# Patient Record
Sex: FEMALE | Race: WHITE | URBAN - METROPOLITAN AREA
[De-identification: names, ages, dates, MRNs, and addresses within clinical notes are randomized per-mention and may not be internally consistent; named-entity substitution may affect disease eponyms.]

---

## 2017-09-05 ENCOUNTER — OFFICE VISIT (OUTPATIENT)
Dept: URGENT CARE | Facility: CLINIC | Age: 57
End: 2017-09-05
Payer: COMMERCIAL

## 2017-09-05 VITALS
TEMPERATURE: 98.5 F | RESPIRATION RATE: 12 BRPM | WEIGHT: 126 LBS | HEART RATE: 66 BPM | BODY MASS INDEX: 22.32 KG/M2 | SYSTOLIC BLOOD PRESSURE: 120 MMHG | OXYGEN SATURATION: 98 % | HEIGHT: 63 IN | DIASTOLIC BLOOD PRESSURE: 74 MMHG

## 2017-09-05 DIAGNOSIS — K57.92 ACUTE DIVERTICULITIS: ICD-10-CM

## 2017-09-05 PROCEDURE — 99203 OFFICE O/P NEW LOW 30 MIN: CPT | Performed by: FAMILY MEDICINE

## 2017-09-05 RX ORDER — CIPROFLOXACIN 500 MG/1
500 TABLET, FILM COATED ORAL 2 TIMES DAILY
Qty: 20 TAB | Refills: 0 | Status: SHIPPED | OUTPATIENT
Start: 2017-09-05 | End: 2017-09-15

## 2017-09-05 NOTE — PROGRESS NOTES
"Subjective:      Nila Hammond is a 57 y.o. female who presents with Diverticulitis (had it a year ago and having a reoccurance, abdominal pressure, tenderness, aches, cramping, constipation )  Patient has had several days of transverse abdominal cramping pressure altered bowel habits. She had a similar experience approximately a year ago was diagnosed with diverticulitis based on Cipro and Flagyl with resolution of symptoms. She denies any current fevers or chills no blood in her stool no nausea or vomiting. She did have a colonoscopy several years ago no mention at that time of diverticulosis. She's not had a CT scan confirming the diagnosis of diverticulitis or diverticulosis. She did not tolerate the Flagyl 2 well last time she was treated. She's had multiple abdominal surgeries no history of adhesions or small bowel obstruction  No dysuria or hematuria  sHPI  ROS  PMH:  has no past medical history on file.  MEDS: No current outpatient prescriptions on file.  ALLERGIES:   Allergies   Allergen Reactions   • Percocet [Apap-Fd&C Red #40 Al Dillon-Oxycodone]      Hallucinations    SURGHX: Appendectomy,  ×2, hernia surgery  SOCHX:  reports that she has never smoked. She has never used smokeless tobacco.  FH: Family history was reviewed, no pertinent findings to report     Objective:     /74   Pulse 66   Temp 36.9 °C (98.5 °F)   Resp 12   Ht 1.6 m (5' 3\")   Wt 57.2 kg (126 lb)   SpO2 98%   Breastfeeding? No   BMI 22.32 kg/m²      Physical Exam   Constitutional: She is oriented to person, place, and time. She appears well-developed and well-nourished. No distress.   HENT:   Head: Normocephalic.   Mouth/Throat: Oropharynx is clear and moist.   Cardiovascular: Normal rate, regular rhythm and normal heart sounds.    Pulmonary/Chest: Effort normal and breath sounds normal.   Abdominal: Soft. Bowel sounds are normal. She exhibits no distension and no mass. There is tenderness. There is no rebound and " no guarding. No hernia.       Musculoskeletal: Normal range of motion.   Neurological: She is alert and oriented to person, place, and time.   Skin: Skin is warm and dry. No rash noted. She is not diaphoretic. No erythema. No pallor.   Psychiatric: She has a normal mood and affect. Her behavior is normal. Judgment and thought content normal.          Assessment/Plan:     1. Acute diverticulitis  ciprofloxacin (CIPRO) 500 MG Tab     Patient will follow up when she gets back home to [place] with her primary care provider in 2 weeks will have a CT scan done at that time as she has not had skin that has demonstrated diverticulosis or iritis or a colonoscopy that has shown emergency room precautions are given

## 2018-10-17 ENCOUNTER — OFFICE VISIT (OUTPATIENT)
Dept: URGENT CARE | Facility: CLINIC | Age: 58
End: 2018-10-17
Payer: COMMERCIAL

## 2018-10-17 VITALS
DIASTOLIC BLOOD PRESSURE: 76 MMHG | TEMPERATURE: 100.1 F | OXYGEN SATURATION: 94 % | HEIGHT: 63 IN | SYSTOLIC BLOOD PRESSURE: 120 MMHG | WEIGHT: 127.4 LBS | HEART RATE: 88 BPM | BODY MASS INDEX: 22.57 KG/M2

## 2018-10-17 DIAGNOSIS — K57.92 DIVERTICULITIS: Primary | ICD-10-CM

## 2018-10-17 PROCEDURE — 99214 OFFICE O/P EST MOD 30 MIN: CPT | Performed by: FAMILY MEDICINE

## 2018-10-17 RX ORDER — METRONIDAZOLE 500 MG/1
500 TABLET ORAL 3 TIMES DAILY
Qty: 30 TAB | Refills: 0 | Status: SHIPPED | OUTPATIENT
Start: 2018-10-17 | End: 2018-10-27

## 2018-10-17 RX ORDER — CIPROFLOXACIN 500 MG/1
500 TABLET, FILM COATED ORAL 2 TIMES DAILY
Qty: 20 TAB | Refills: 0 | Status: SHIPPED | OUTPATIENT
Start: 2018-10-17 | End: 2018-10-27

## 2018-10-17 ASSESSMENT — ENCOUNTER SYMPTOMS
HEMATOCHEZIA: 0
CONSTIPATION: 1
COUGH: 0
CHILLS: 0
DIARRHEA: 1
ANOREXIA: 0
FLATUS: 1
SHORTNESS OF BREATH: 0
FEVER: 1
VOMITING: 0
BELCHING: 1
ABDOMINAL PAIN: 1
NAUSEA: 0

## 2018-10-17 ASSESSMENT — CROHNS DISEASE ACTIVITY INDEX (CDAI): CDAI SCORE: 0

## 2018-10-17 NOTE — PROGRESS NOTES
Subjective:   Nila Hammond is a 58 y.o. female who presents for Abdominal Pain (x4 days )    Abdominal Pain   This is a new problem. Episode onset: 4 days. The onset quality is gradual. The problem occurs constantly. The problem has been unchanged. The pain is located in the generalized abdominal region. The pain is moderate. The quality of the pain is dull, cramping and aching. The abdominal pain does not radiate. Associated symptoms include belching, constipation, diarrhea, a fever and flatus. Pertinent negatives include no anorexia, dysuria, frequency, hematochezia, hematuria, nausea or vomiting. Nothing aggravates the pain. The pain is relieved by nothing. She has tried nothing for the symptoms. Prior workup: Colonoscopy in May- Negative  Her past medical history is significant for irritable bowel syndrome. There is no history of abdominal surgery, colon cancer, Crohn's disease, gallstones or ulcerative colitis.     Patient has recurrent episodes of diverticulitis, commonly associated with travel.  She was just traveling to  x 1 week ago.  Last BM: Today 2 PM loose, no blood or mucus or greasy appearance.  Patient has a follow-up scheduled with PCP next week.    Review of Systems   Constitutional: Positive for fever. Negative for chills and malaise/fatigue.   Respiratory: Negative for cough and shortness of breath.    Gastrointestinal: Positive for abdominal pain, constipation, diarrhea and flatus. Negative for anorexia, hematochezia, nausea and vomiting.   Genitourinary: Negative for dysuria, frequency and hematuria.   All other systems reviewed and are negative.      PMH:  has no past medical history on file.  MEDS:   Current Outpatient Prescriptions:   •  ciprofloxacin (CIPRO) 500 MG Tab, Take 1 Tab by mouth 2 times a day for 10 days., Disp: 20 Tab, Rfl: 0  •  metroNIDAZOLE (FLAGYL) 500 MG Tab, Take 1 Tab by mouth 3 times a day for 10 days., Disp: 30 Tab, Rfl: 0  ALLERGIES:   Allergies   Allergen  "Reactions   • Percocet [Apap-Fd&C Red #40 Al Dillon-Oxycodone]      Hallucinations      SURGHX: History reviewed. No pertinent surgical history.  SOCHX:  reports that she has never smoked. She has never used smokeless tobacco.  History reviewed. No pertinent family history.     Objective:   /76 (BP Location: Right arm, Patient Position: Sitting, BP Cuff Size: Adult)   Pulse 88   Temp 37.8 °C (100.1 °F) (Temporal)   Ht 1.6 m (5' 3\")   Wt 57.8 kg (127 lb 6.4 oz)   SpO2 94%   BMI 22.57 kg/m²     Physical Exam   Constitutional: She is oriented to person, place, and time. She appears well-developed and well-nourished. No distress.   HENT:   Head: Normocephalic and atraumatic.   Eyes: Pupils are equal, round, and reactive to light. Conjunctivae are normal.   Neck: Normal range of motion. Neck supple.   Cardiovascular: Normal rate and regular rhythm.    Pulmonary/Chest: Effort normal and breath sounds normal.   Abdominal: Soft. Normal appearance and bowel sounds are normal. She exhibits no distension and no mass. There is generalized tenderness. There is no rigidity, no rebound, no guarding, no CVA tenderness, no tenderness at McBurney's point and negative Bar's sign.   Lymphadenopathy:     She has no cervical adenopathy.   Neurological: She is alert and oriented to person, place, and time.   Skin: Skin is warm and dry.   Psychiatric: She has a normal mood and affect. Her behavior is normal.   Vitals reviewed.        Assessment/Plan:     1. Diverticulitis  ciprofloxacin (CIPRO) 500 MG Tab    metroNIDAZOLE (FLAGYL) 500 MG Tab     Patient directed to take full course of abx. If sx worsen or persist patient directed to return to clinic for reevaluation. Supportive care reviewed including: Increase fluids, OTC Tylenol/ibuprofen.  Diverticulitis educational handout given to patient.    Patient directed to follow-up with primary care as previously scheduled next week.  She declines imaging and UA at this time, she " states symptoms are the same from previous diverticulitis episodes. Red flags and strict emergency room precautions discussed.  Patient appears understanding of information.    Case was reviewed and discussed by Dr. Thompson with Anali Espino PA-C during her training period.

## 2018-10-18 NOTE — PATIENT INSTRUCTIONS
Diverticulitis  Diverticulitis is when small pockets that have formed in your colon (large intestine) become infected or swollen.  Follow these instructions at home:  · Follow your doctor's instructions.  · Follow a special diet if told by your doctor.  · When you feel better, your doctor may tell you to change your diet. You may be told to eat a lot of fiber. Fruits and vegetables are good sources of fiber. Fiber makes it easier to poop (have bowel movements).  · Take supplements or probiotics as told by your doctor.  · Only take medicines as told by your doctor.  · Keep all follow-up visits with your doctor.  Contact a doctor if:  · Your pain does not get better.  · You have a hard time eating food.  · You are not pooping like normal.  Get help right away if:  · Your pain gets worse.  · Your problems do not get better.  · Your problems suddenly get worse.  · You have a fever.  · You keep throwing up (vomiting).  · You have bloody or black, tarry poop (stool).  This information is not intended to replace advice given to you by your health care provider. Make sure you discuss any questions you have with your health care provider.  Document Released: 06/05/2009 Document Revised: 05/25/2017 Document Reviewed: 11/12/2014  [employer] Patient Education © 2017 [employer]